# Patient Record
Sex: MALE | Race: WHITE | Employment: UNEMPLOYED | URBAN - METROPOLITAN AREA
[De-identification: names, ages, dates, MRNs, and addresses within clinical notes are randomized per-mention and may not be internally consistent; named-entity substitution may affect disease eponyms.]

---

## 2020-09-02 ENCOUNTER — HOSPITAL ENCOUNTER (EMERGENCY)
Age: 85
Discharge: HOME | End: 2020-09-02
Payer: MEDICARE

## 2020-09-02 ENCOUNTER — APPOINTMENT (OUTPATIENT)
Dept: GENERAL RADIOLOGY | Age: 85
End: 2020-09-02
Payer: MEDICARE

## 2020-09-02 VITALS
TEMPERATURE: 98 F | DIASTOLIC BLOOD PRESSURE: 77 MMHG | SYSTOLIC BLOOD PRESSURE: 164 MMHG | HEART RATE: 60 BPM | RESPIRATION RATE: 18 BRPM | OXYGEN SATURATION: 99 %

## 2020-09-02 PROCEDURE — 6370000000 HC RX 637 (ALT 250 FOR IP): Performed by: PHYSICIAN ASSISTANT

## 2020-09-02 PROCEDURE — 99283 EMERGENCY DEPT VISIT LOW MDM: CPT

## 2020-09-02 PROCEDURE — 73130 X-RAY EXAM OF HAND: CPT

## 2020-09-02 PROCEDURE — 6360000002 HC RX W HCPCS: Performed by: PHYSICIAN ASSISTANT

## 2020-09-02 PROCEDURE — 90471 IMMUNIZATION ADMIN: CPT | Performed by: PHYSICIAN ASSISTANT

## 2020-09-02 PROCEDURE — 90715 TDAP VACCINE 7 YRS/> IM: CPT | Performed by: PHYSICIAN ASSISTANT

## 2020-09-02 RX ORDER — CEPHALEXIN 500 MG/1
500 CAPSULE ORAL 4 TIMES DAILY
Qty: 20 CAPSULE | Refills: 0 | Status: SHIPPED | OUTPATIENT
Start: 2020-09-02 | End: 2020-09-07

## 2020-09-02 RX ORDER — BACITRACIN, NEOMYCIN, POLYMYXIN B 400; 3.5; 5 [USP'U]/G; MG/G; [USP'U]/G
OINTMENT TOPICAL ONCE
Status: COMPLETED | OUTPATIENT
Start: 2020-09-02 | End: 2020-09-02

## 2020-09-02 RX ADMIN — BACITRACIN ZINC, NEOMYCIN SULFATE, AND POLYMYXIN B SULFATE: 400; 3.5; 5 OINTMENT TOPICAL at 17:33

## 2020-09-02 RX ADMIN — TETANUS TOXOID, REDUCED DIPHTHERIA TOXOID AND ACELLULAR PERTUSSIS VACCINE, ADSORBED 0.5 ML: 5; 2.5; 8; 8; 2.5 SUSPENSION INTRAMUSCULAR at 15:25

## 2020-09-02 NOTE — ED NOTES
nonadherant dressing applied with bulky dressing. Pt discharged from ED to home. Pt verbalizes understanding to discharge instructions, teach back successful. Pt denies questions at this time. No acute distress noted. Resp even and unlabored. A/ox4. Pt instructed to follow-up as noted - return to ED if symptoms worsen. Pt verbalizes understanding. Discharged per ED PA with discharge instructions. Pt refuses ambulatory assistance to lobby and walks with steady gait.         Silvia Ivey RN  09/02/20 1034

## 2020-09-02 NOTE — ED TRIAGE NOTES
Pt presents to ED with c/o of a skin tear to right hand. Reports  fell on hand. Bleeding controlled. +thinners. Resp even and unlabored. A/ox4. No acute distress noted. Denies any need at this time. Call light within reach. Bed in lowest position. Will continue to monitor.

## 2020-09-02 NOTE — ED PROVIDER NOTES
**ADVANCED PRACTICE PROVIDER, I HAVE EVALUATED THIS PATIENT**        [address]      Pt Name: Steve Pedraza    [place] 1933  Date of evaluation: 2020  Provider: Tonya Gaemz PA-C      Chief Complaint:    Chief Complaint   Patient presents with    Laceration     right hand       Nursing Notes, Past Medical Hx, Past Surgical Hx, Social Hx, Allergies, and Family Hx were all reviewed and agreed with or any disagreements were addressed in the HPI.    HPI:  (Location, Duration, Timing, Severity, Quality, Assoc Sx, Context, Modifying factors)  This is a  80 y.o. male laceration to his right hand has a flap laceration between the MCP of the third and fourth finger. Denies numbness or tingling. This was caused by the lawnmower tipped scraped this hand. Bleeding was controlled with direct pressure. He is right-hand dominant. Last tetanus unknown. We will update his tetanus here today in emergency room. No other complaints. PastMedical/Surgical History:      Diagnosis Date    A-fib ([facility]     Cancer ([facility]     prostate    Hyperlipidemia      History reviewed. No pertinent surgical history. Medications:  Previous Medications    ASPIRIN 81 MG TABLET    Take 81 mg by mouth daily. CHOLECALCIFEROL (VITAMIN D PO)    Take 4 tablets by mouth daily. MULTIPLE MINERALS-VITAMINS (PROSTEON) TABS    Take 4 tablets by mouth daily. PRAVASTATIN (PRAVACHOL) 40 MG TABLET    Take 40 mg by mouth daily. Review of Systems:  Review of Systems   Constitutional: Negative for chills and fever. HENT: Negative for congestion, facial swelling and sneezing. Eyes: Negative for discharge and redness. Respiratory: Negative for apnea, choking and shortness of breath. Cardiovascular: Negative for chest pain. Gastrointestinal: Negative for abdominal pain, nausea and vomiting. Genitourinary: Negative for dysuria. Musculoskeletal: Negative for back pain, neck pain and neck stiffness. Neurological: Negative for dizziness, tremors, seizures and headaches. All other systems reviewed and are negative. Positives and Pertinent negatives as per HPI. Except as noted above in the ROS, problem specific ROS was completed and is negative. Physical Exam:  Physical Exam  Vitals signs and nursing note reviewed. Constitutional:       Appearance: He is well-developed. He is not diaphoretic. HENT:      Head: Normocephalic and atraumatic. Nose: Nose normal.   Eyes:      General:         Right eye: No discharge. Left eye: No discharge. Neck:      Musculoskeletal: Normal range of motion and neck supple. Cardiovascular:      Rate and Rhythm: Normal rate and regular rhythm. Heart sounds: Normal heart sounds. No murmur. No friction rub. No gallop. Pulmonary:      Effort: Pulmonary effort is normal. No respiratory distress. Breath sounds: Normal breath sounds. No wheezing or rales. Chest:      Chest wall: No tenderness. Musculoskeletal: Normal range of motion. Right hand: He exhibits laceration. He exhibits normal range of motion, no tenderness, no bony tenderness, normal two-point discrimination, normal capillary refill and no deformity. Normal sensation noted. Hands:    Skin:     General: Skin is warm and dry. Neurological:      Mental Status: He is alert and oriented to person, place, and time. Psychiatric:         Behavior: Behavior normal.         MEDICAL DECISION MAKING    Vitals:    Vitals:    09/02/20 1343   BP: (!) 164/77   Pulse: 60   Resp: 18   Temp: 98 °F (36.7 °C)   TempSrc: Oral   SpO2: 99%       LABS:Labs Reviewed - No data to display     Remainder of labs reviewed and werenegative at this time or not returned at the time of this note.     RADIOLOGY:   Non-plain film images such as CT, Ultrasound and MRI are read by the radiologist. Lety Blair PA-C have directly visualized the radiologic plain film image(s) with the below findings:        Interpretation per the Radiologist below, if available at the time of this note:    XR HAND RIGHT (MIN 3 VIEWS)   Final Result   Negative radiographs of the right hand. No fracture or evidence of foreign   body. Xr Hand Right (min 3 Views)    Result Date: 9/2/2020  EXAMINATION: THREE XRAY VIEWS OF THE RIGHT HAND 9/2/2020 3:26 pm COMPARISON: None. HISTORY: ORDERING SYSTEM PROVIDED HISTORY: Laceration TECHNOLOGIST PROVIDED HISTORY: Reason for exam:->Laceration Reason for Exam: cut with  blade base of 3rd Acuity: Acute Type of Exam: Initial FINDINGS: No fracture or dislocation. No evidence of foreign body. Osteoarthritic changes noted. Negative radiographs of the right hand. No fracture or evidence of foreign body. MEDICAL DECISION MAKING / ED COURSE:      PROCEDURES:   Procedures    None    Patient was given:  Medications   Tetanus-Diphth-Acell Pertussis (BOOSTRIX) injection 0.5 mL (0.5 mLs Intramuscular Given 9/2/20 1525)       Emergency room course: Patient on exam cardiovascular regular rhythm, lungs are clear. No wheeze, rales or rhonchi. Examination patient right upper extremity shoulder elbow and wrist are all nontender with full range of motion. Full range of motion of all digits at the MCP PIP and DIP joints. Capillary refill less than 2 seconds all digits and radial pulse good 2+ the patient has a crescent-shaped skin flap laceration between the metacarpal area and the webspace of the third and fourth finger. No foreign body noted. No tendon involvement. No fractures noted. Patient received a total of nine 5-0 nylon sutures in interrupted manner. Patient instructed to have sutures removed in 10 days. Follow his primary care physician. I did give him prescription of Keflex start taking if you notice any sign of infection.   Definitely follow-up with Dr. Alfredito Olivo if he noticed any infection return here as needed. Take OTC Motrin or Tylenol as needed for pain. He did receive a bulky dressing and Polysporin over the area. See procedure note above. He will be discharged in stable condition. The patient tolerated their visit well. I evaluated the patient. The physician was available for consultation as needed. The patient and / or the family were informed of the results of any tests, a time was given to answer questions, a plan was proposed and they agreed with plan. CLINICAL IMPRESSION:  1.  Laceration of right hand without foreign body, initial encounter        DISPOSITION  DISPOSITION Decision To Discharge 09/02/2020 05:12:37 PM          PATIENT REFERRED TO:  Dulce Maria Vera  [address] [facility] [employer] [facility]  [address]  [phone]    Call in 10 days  For suture removal      DISCHARGE MEDICATIONS:  New Prescriptions    CEPHALEXIN (KEFLEX) 500 MG CAPSULE    Take 1 capsule by mouth 4 times daily for 5 days       DISCONTINUED MEDICATIONS:  Discontinued Medications    No medications on file              (Please note the MDM and HPI sections of this note were completed with a voice recognition program.  Efforts were made to edit the dictations but occasionally words are mis-transcribed.)    Electronically signed, Lana Begum PA-C,          Lana Begum PA-C  09/04/20 5913

## 2020-09-04 ASSESSMENT — ENCOUNTER SYMPTOMS
CHOKING: 0
EYE REDNESS: 0
NAUSEA: 0
BACK PAIN: 0
SHORTNESS OF BREATH: 0
EYE DISCHARGE: 0
VOMITING: 0
APNEA: 0
ABDOMINAL PAIN: 0
FACIAL SWELLING: 0